# Patient Record
Sex: MALE | Race: WHITE | NOT HISPANIC OR LATINO | Employment: FULL TIME | URBAN - NONMETROPOLITAN AREA
[De-identification: names, ages, dates, MRNs, and addresses within clinical notes are randomized per-mention and may not be internally consistent; named-entity substitution may affect disease eponyms.]

---

## 2021-12-23 ENCOUNTER — OFFICE VISIT (OUTPATIENT)
Dept: URGENT CARE | Facility: CLINIC | Age: 46
End: 2021-12-23
Payer: COMMERCIAL

## 2021-12-23 VITALS
RESPIRATION RATE: 20 BRPM | OXYGEN SATURATION: 97 % | WEIGHT: 227.2 LBS | HEART RATE: 120 BPM | HEIGHT: 67 IN | BODY MASS INDEX: 35.66 KG/M2 | TEMPERATURE: 99.3 F

## 2021-12-23 DIAGNOSIS — J01.10 ACUTE FRONTAL SINUSITIS, RECURRENCE NOT SPECIFIED: Primary | ICD-10-CM

## 2021-12-23 DIAGNOSIS — R05.9 COUGH: ICD-10-CM

## 2021-12-23 PROBLEM — F41.9 ANXIETY: Status: ACTIVE | Noted: 2021-04-16

## 2021-12-23 PROBLEM — E78.5 DYSLIPIDEMIA: Status: ACTIVE | Noted: 2019-04-08

## 2021-12-23 PROBLEM — Z87.39 HISTORY OF GOUT: Status: ACTIVE | Noted: 2021-04-16

## 2021-12-23 PROCEDURE — 99203 OFFICE O/P NEW LOW 30 MIN: CPT | Performed by: PHYSICIAN ASSISTANT

## 2021-12-23 PROCEDURE — 87636 SARSCOV2 & INF A&B AMP PRB: CPT | Performed by: PHYSICIAN ASSISTANT

## 2021-12-23 RX ORDER — AMOXICILLIN AND CLAVULANATE POTASSIUM 875; 125 MG/1; MG/1
1 TABLET, FILM COATED ORAL EVERY 12 HOURS SCHEDULED
Qty: 14 TABLET | Refills: 0 | Status: SHIPPED | OUTPATIENT
Start: 2021-12-23 | End: 2021-12-30

## 2021-12-23 RX ORDER — INDOMETHACIN 25 MG/1
CAPSULE ORAL
COMMUNITY
Start: 2021-10-22

## 2021-12-26 LAB
FLUAV RNA RESP QL NAA+PROBE: POSITIVE
FLUBV RNA RESP QL NAA+PROBE: NEGATIVE
SARS-COV-2 RNA RESP QL NAA+PROBE: NEGATIVE

## 2022-05-17 ENCOUNTER — OFFICE VISIT (OUTPATIENT)
Dept: URGENT CARE | Facility: CLINIC | Age: 47
End: 2022-05-17
Payer: COMMERCIAL

## 2022-05-17 VITALS
TEMPERATURE: 98 F | RESPIRATION RATE: 20 BRPM | DIASTOLIC BLOOD PRESSURE: 78 MMHG | HEART RATE: 94 BPM | SYSTOLIC BLOOD PRESSURE: 138 MMHG | OXYGEN SATURATION: 95 %

## 2022-05-17 DIAGNOSIS — M10.9 ACUTE GOUT INVOLVING TOE OF LEFT FOOT, UNSPECIFIED CAUSE: Primary | ICD-10-CM

## 2022-05-17 PROCEDURE — 99213 OFFICE O/P EST LOW 20 MIN: CPT

## 2022-05-17 RX ORDER — PREDNISONE 20 MG/1
40 TABLET ORAL DAILY
Qty: 10 TABLET | Refills: 0 | Status: SHIPPED | OUTPATIENT
Start: 2022-05-17 | End: 2022-05-22

## 2022-05-17 NOTE — PATIENT INSTRUCTIONS
Take the prednisone once daily for 5 days  Stop taking the indomethacin  Apply ice to the area  Avoid foods that are high in purines including shellfish and beers

## 2022-05-17 NOTE — PROGRESS NOTES
[facility]        NAME: Allison Hernandez is a 48 y o  male  : 1975    MRN: [id number]  DATE: May 17, 2022  TIME: 8:05 PM    Assessment and Plan   Acute gout involving toe of left foot, unspecified cause [M10 9]  1  Acute gout involving toe of left foot, unspecified cause  predniSONE 20 mg tablet     Recheck of patient's blood pressure reveals normal blood pressure will trial 5 days of prednisone therapy and as indomethacin is not working  Patient advised if this does not work to proceed to orthopedics or family provider for possible intra-articular joint injection  Patient Instructions   Take the prednisone once daily for 5 days  Stop taking the indomethacin  Apply ice to the area  Avoid foods that are high in purines including shellfish and beers  Follow up with PCP in 3-5 days  Proceed to  ER if symptoms worsen  Chief Complaint     Chief Complaint   Patient presents with    gout flare up x 1 week     Out of his Indomethacin         History of Present Illness       Patient is a 48year old male who present to the office today for gout flare states that he started indomethacin by his PCP on Tuesday  States the flare has been going on for about 1 week  Did admit to seafood on mothers day  States only drinks alcohol about once a week  Has tried excedrin and icy hot without relief  Review of Systems   Review of Systems   Constitutional: Negative for chills, fatigue and fever  Respiratory: Negative for cough, shortness of breath and wheezing  Cardiovascular: Negative for chest pain and palpitations  Musculoskeletal: Positive for joint swelling (Base of right great toe)  Skin: Positive for color change  All other systems reviewed and are negative  Current Medications       Current Outpatient Medications:     predniSONE 20 mg tablet, Take 2 tablets (40 mg total) by mouth in the morning for 5 days  , Disp: 10 tablet, Rfl: 0    indomethacin (INDOCIN) 25 mg capsule, Take 1 tablet every 8 hours during a gout flare  May stop 2 days after resolution of symptoms  Do not take for more than 5 to 7 days  , Disp: , Rfl:     Current Allergies     Allergies as of 05/17/2022    (No Known Allergies)            The following portions of the patient's history were reviewed and updated as appropriate: allergies, current medications, past family history, past medical history, past social history, past surgical history and problem list      History reviewed  No pertinent past medical history  History reviewed  No pertinent surgical history  History reviewed  No pertinent family history  Medications have been verified  Objective   /78   Pulse 94   Temp 98 °F (36 7 °C)   Resp 20   SpO2 95%        Physical Exam     Physical Exam  Vitals and nursing note reviewed  Constitutional:       General: He is not in acute distress  Appearance: Normal appearance  He is normal weight  He is not ill-appearing or toxic-appearing  Cardiovascular:      Rate and Rhythm: Normal rate and regular rhythm  Pulses: Normal pulses  Heart sounds: Normal heart sounds  No murmur heard  No friction rub  No gallop  Pulmonary:      Effort: Pulmonary effort is normal  No respiratory distress  Breath sounds: Normal breath sounds  No stridor  No wheezing, rhonchi or rales  Chest:      Chest wall: No tenderness  Musculoskeletal:         General: Swelling and tenderness present  Feet:    Skin:     General: Skin is warm and dry  Capillary Refill: Capillary refill takes less than 2 seconds  Neurological:      General: No focal deficit present  Mental Status: He is alert

## 2023-05-24 ENCOUNTER — OFFICE VISIT (OUTPATIENT)
Dept: URGENT CARE | Facility: CLINIC | Age: 48
End: 2023-05-24

## 2023-05-24 VITALS
RESPIRATION RATE: 20 BRPM | DIASTOLIC BLOOD PRESSURE: 96 MMHG | OXYGEN SATURATION: 96 % | HEART RATE: 90 BPM | SYSTOLIC BLOOD PRESSURE: 137 MMHG | TEMPERATURE: 97.7 F

## 2023-05-24 DIAGNOSIS — J06.9 VIRAL URI WITH COUGH: Primary | ICD-10-CM

## 2023-05-24 RX ORDER — ALLOPURINOL 100 MG/1
100 TABLET ORAL DAILY
COMMUNITY

## 2023-05-24 RX ORDER — BENZONATATE 200 MG/1
200 CAPSULE ORAL 3 TIMES DAILY PRN
Qty: 20 CAPSULE | Refills: 0 | Status: SHIPPED | OUTPATIENT
Start: 2023-05-24

## 2023-05-24 RX ORDER — FLUTICASONE PROPIONATE 50 MCG
1 SPRAY, SUSPENSION (ML) NASAL DAILY
Qty: 9.9 ML | Refills: 0 | Status: SHIPPED | OUTPATIENT
Start: 2023-05-24

## 2023-05-24 NOTE — PROGRESS NOTES
"  [facility]        NAME: Gladis Robison is a 50 y o  male  : 1975    MRN: [id number]  DATE: May 24, 2023  TIME: 6:08 PM    Assessment and Plan   Viral URI with cough [J06 9]  1  Viral URI with cough  fluticasone (FLONASE) 50 mcg/act nasal spray    benzonatate (TESSALON) 200 MG capsule        Symptoms consistent with viral illness recommend supportive care we will send Tessalon for cough and Flonase  Patient Instructions   Pt appears to have a viral upper respiratory infection  Antibiotics are contraindicated at this time and would not help you feel better faster  Although the symptoms are troublesome, usually the patient's body is able to recover from a viral infection on an average time of 7-10 days  Fever, if any, typically resolves after 3-5 days  If patient has sore throat, typically this resolves within 3-5 days  Any nasal congestion, runny nose, post nasal drip typically begin to  improve after 7-10 days  Any cough may linger over a couple weeks  Please note that having a cough is not necessarily a bad thing  It often times is part of our body's protective mechanism to help keep our airways clear  Please note that yellow mucous doesn't necessarily mean a \"bacterial\" infection  Yellow mucous doesn't automatically mean that an antibiotic is needed  It is not unusual for mucus to become more discolored in the days after the start of an upper respiratory infection  Often times this is due to mucous that has thickened  with white blood cells that have flooded the mucosa to try and fight the viral infection  Ear Pain may occur when the eustachian tubes become blocked with mucous or swollen due to acute inflammation from illness  May give Ibuprofen or Tylenol as needed for comfort  May also use warm compress against ear for comfort    If ear ache is persisting and not improving over 2-3 days or if there is any gross drainage coming from ear, please seek further " evaluation  Natural remedies to help provide comfort for cough/ cold symptoms include: one teaspoon of honey (only in infants over 1 year of age), increased vitamin C (oranges, kit, etc ), ginger, and drinking plenty of fluids  Vaporizer by bedside  If you should have prolonged symptoms (Greater than 10 days), worsening symptoms, or any new symptoms please seek further medical attention  If you would be having difficulty breathing, seek further evaluation by calling 911 or proceeding to ER for further evaluation  Follow up with PCP in 3-5 days  Proceed to  ER if symptoms worsen  Chief Complaint     Chief Complaint   Patient presents with   • Cold Like Symptoms     Sore throat, congestion and cough since Thursday         History of Present Illness       Patient is a 50year old male who presents to the office today for cough, rhinorrhea, nasal congestion, headache  Does have some sinus pressure  Wife is here with similar symptoms   started on Thursday  No sore throat, fever  Has been taking delsym, advil cold and flu with minimal relief  Review of Systems   Review of Systems   Constitutional: Negative for fever  HENT: Positive for congestion, postnasal drip, rhinorrhea and sinus pressure  Respiratory: Positive for cough  Negative for shortness of breath and wheezing  Cardiovascular: Negative for chest pain and palpitations  Neurological: Negative for headaches  All other systems reviewed and are negative          Current Medications       Current Outpatient Medications:   •  allopurinol (ZYLOPRIM) 100 mg tablet, Take 100 mg by mouth daily, Disp: , Rfl:   •  benzonatate (TESSALON) 200 MG capsule, Take 1 capsule (200 mg total) by mouth 3 (three) times a day as needed for cough, Disp: 20 capsule, Rfl: 0  •  fluticasone (FLONASE) 50 mcg/act nasal spray, 1 spray into each nostril daily, Disp: 9 9 mL, Rfl: 0  •  indomethacin (INDOCIN) 25 mg capsule, Take 1 tablet every 8 hours during a gout flare  May stop 2 days after resolution of symptoms  Do not take for more than 5 to 7 days  (Patient not taking: Reported on 5/24/2023), Disp: , Rfl:     Current Allergies     Allergies as of 05/24/2023   • (No Known Allergies)            The following portions of the patient's history were reviewed and updated as appropriate: allergies, current medications, past family history, past medical history, past social history, past surgical history and problem list      Past Medical History:   Diagnosis Date   • Gout        History reviewed  No pertinent surgical history  History reviewed  No pertinent family history  Medications have been verified  Objective   /96   Pulse 90   Temp 97 7 °F (36 5 °C)   Resp 20   SpO2 96%        Physical Exam     Physical Exam  Vitals and nursing note reviewed  Constitutional:       General: He is not in acute distress  Appearance: Normal appearance  He is normal weight  He is not ill-appearing or toxic-appearing  HENT:      Right Ear: Tympanic membrane normal       Left Ear: Tympanic membrane normal       Nose: Congestion present  Right Turbinates: Enlarged and swollen  Left Turbinates: Enlarged and swollen  Mouth/Throat:      Mouth: Mucous membranes are moist    Cardiovascular:      Rate and Rhythm: Normal rate and regular rhythm  Pulses: Normal pulses  Heart sounds: Normal heart sounds  Pulmonary:      Effort: Pulmonary effort is normal       Breath sounds: Normal breath sounds  Skin:     General: Skin is warm  Capillary Refill: Capillary refill takes less than 2 seconds  Neurological:      General: No focal deficit present  Mental Status: He is alert and oriented to person, place, and time

## 2023-05-24 NOTE — PATIENT INSTRUCTIONS
"Pt appears to have a viral upper respiratory infection  Antibiotics are contraindicated at this time and would not help you feel better faster  Although the symptoms are troublesome, usually the patient's body is able to recover from a viral infection on an average time of 7-10 days  Fever, if any, typically resolves after 3-5 days  If patient has sore throat, typically this resolves within 3-5 days  Any nasal congestion, runny nose, post nasal drip typically begin to  improve after 7-10 days  Any cough may linger over a couple weeks  Please note that having a cough is not necessarily a bad thing  It often times is part of our body's protective mechanism to help keep our airways clear  Please note that yellow mucous doesn't necessarily mean a \"bacterial\" infection  Yellow mucous doesn't automatically mean that an antibiotic is needed  It is not unusual for mucus to become more discolored in the days after the start of an upper respiratory infection  Often times this is due to mucous that has thickened  with white blood cells that have flooded the mucosa to try and fight the viral infection  Ear Pain may occur when the eustachian tubes become blocked with mucous or swollen due to acute inflammation from illness  May give Ibuprofen or Tylenol as needed for comfort  May also use warm compress against ear for comfort  If ear ache is persisting and not improving over 2-3 days or if there is any gross drainage coming from ear, please seek further evaluation  Natural remedies to help provide comfort for cough/ cold symptoms include: one teaspoon of honey (only in infants over 1 year of age), increased vitamin C (oranges, kit, etc ), ginger, and drinking plenty of fluids  Vaporizer by bedside  If you should have prolonged symptoms (Greater than 10 days), worsening symptoms, or any new symptoms please seek further medical attention      If you would be having difficulty breathing, seek " further evaluation by calling 911 or proceeding to ER for further evaluation

## 2023-12-06 ENCOUNTER — OFFICE VISIT (OUTPATIENT)
Dept: URGENT CARE | Facility: CLINIC | Age: 48
End: 2023-12-06
Payer: COMMERCIAL

## 2023-12-06 VITALS
RESPIRATION RATE: 20 BRPM | SYSTOLIC BLOOD PRESSURE: 138 MMHG | OXYGEN SATURATION: 96 % | BODY MASS INDEX: 37.93 KG/M2 | HEART RATE: 102 BPM | TEMPERATURE: 98.6 F | WEIGHT: 242.2 LBS | DIASTOLIC BLOOD PRESSURE: 100 MMHG

## 2023-12-06 DIAGNOSIS — J01.90 ACUTE SINUSITIS, RECURRENCE NOT SPECIFIED, UNSPECIFIED LOCATION: Primary | ICD-10-CM

## 2023-12-06 PROCEDURE — 99213 OFFICE O/P EST LOW 20 MIN: CPT | Performed by: PHYSICIAN ASSISTANT

## 2023-12-06 RX ORDER — BROMPHENIRAMINE MALEATE, PSEUDOEPHEDRINE HYDROCHLORIDE, AND DEXTROMETHORPHAN HYDROBROMIDE 2; 30; 10 MG/5ML; MG/5ML; MG/5ML
10 SYRUP ORAL 3 TIMES DAILY PRN
Qty: 473 ML | Refills: 0 | Status: SHIPPED | OUTPATIENT
Start: 2023-12-06

## 2023-12-06 RX ORDER — AMOXICILLIN AND CLAVULANATE POTASSIUM 875; 125 MG/1; MG/1
1 TABLET, FILM COATED ORAL EVERY 12 HOURS SCHEDULED
Qty: 14 TABLET | Refills: 0 | Status: SHIPPED | OUTPATIENT
Start: 2023-12-06 | End: 2023-12-13

## 2023-12-06 RX ORDER — COLCHICINE 0.6 MG/1
0.6 TABLET ORAL DAILY
COMMUNITY

## 2023-12-06 NOTE — PROGRESS NOTES
[facility]        NAME: Mallory Romano is a 50 y.o. male  : 1975    MRN: [id number]  DATE: 2023  TIME: 5:33 PM    Assessment and Plan   Acute sinusitis, recurrence not specified, unspecified location [J01.90]  1. Acute sinusitis, recurrence not specified, unspecified location  amoxicillin-clavulanate (AUGMENTIN) 875-125 mg per tablet    brompheniramine-pseudoephedrine-DM 30-2-10 MG/5ML syrup            Patient Instructions     Medication as prescribed  Tylenol Sinus over the counter  Warm compresses over sinuses  Steam treatment (practice proper safety precautions when handing hot liquids/steam)  Over the counter saline nasal spray and flonase  Follow up with PCP in 3-5 days. Proceed to  ER if symptoms worsen. Eat yogurt with live and active cultures and/or take a probiotic at least 3 hours before or after antibiotic dose. Monitor stool for diarrhea and/or blood. If this occurs, contact primary care doctor ASAP. Chief Complaint     Chief Complaint   Patient presents with    Cold Like Symptoms     3 weeks of sinus congestion and cough. Using Flonase and Robitussin. History of Present Illness       Sinusitis  This is a new problem. Episode onset: 3 weeks. The problem has been gradually worsening since onset. There has been no fever. The pain is moderate. Associated symptoms include congestion, coughing and sinus pressure. Pertinent negatives include no chills, ear pain, headaches, shortness of breath, sneezing or sore throat. Treatments tried: flonase; robitussin. Review of Systems   Review of Systems   Constitutional:  Negative for chills and fever. HENT:  Positive for congestion, rhinorrhea and sinus pressure. Negative for dental problem, ear discharge, ear pain, facial swelling, postnasal drip, sneezing, sore throat and trouble swallowing. Eyes:  Negative for itching. Respiratory:  Positive for cough.  Negative for chest tightness, shortness of breath and wheezing. Gastrointestinal:  Negative for abdominal pain, constipation, diarrhea, nausea and vomiting. Musculoskeletal:  Negative for myalgias. Skin:  Negative for rash. Neurological:  Negative for dizziness, weakness, light-headedness and headaches. Current Medications       Current Outpatient Medications:     allopurinol (ZYLOPRIM) 100 mg tablet, Take 100 mg by mouth daily, Disp: , Rfl:     amoxicillin-clavulanate (AUGMENTIN) 875-125 mg per tablet, Take 1 tablet by mouth every 12 (twelve) hours for 7 days, Disp: 14 tablet, Rfl: 0    brompheniramine-pseudoephedrine-DM 30-2-10 MG/5ML syrup, Take 10 mL by mouth 3 (three) times a day as needed for cough or congestion, Disp: 473 mL, Rfl: 0    colchicine (COLCRYS) 0.6 mg tablet, Take 0.6 mg by mouth daily, Disp: , Rfl:     fluticasone (FLONASE) 50 mcg/act nasal spray, 1 spray into each nostril daily, Disp: 9.9 mL, Rfl: 0    benzonatate (TESSALON) 200 MG capsule, Take 1 capsule (200 mg total) by mouth 3 (three) times a day as needed for cough (Patient not taking: Reported on 12/6/2023), Disp: 20 capsule, Rfl: 0    indomethacin (INDOCIN) 25 mg capsule, Take 1 tablet every 8 hours during a gout flare. May stop 2 days after resolution of symptoms. Do not take for more than 5 to 7 days. (Patient not taking: Reported on 5/24/2023), Disp: , Rfl:     Current Allergies     Allergies as of 12/06/2023    (No Known Allergies)            The following portions of the patient's history were reviewed and updated as appropriate: allergies, current medications, past family history, past medical history, past social history, past surgical history and problem list.     Past Medical History:   Diagnosis Date    Gout        History reviewed. No pertinent surgical history. History reviewed. No pertinent family history. Medications have been verified.         Objective   /100   Pulse 102   Temp 98.6 °F (37 °C)   Resp 20   Wt 110 kg (242 lb 3.2 oz)   SpO2 96%   BMI 37.93 kg/m²   No LMP for male patient. Physical Exam     Physical Exam  Vitals reviewed. Constitutional:       General: He is not in acute distress. Appearance: He is well-developed. He is not diaphoretic. HENT:      Head: Normocephalic. Right Ear: Tympanic membrane, ear canal and external ear normal.      Left Ear: Tympanic membrane, ear canal and external ear normal.      Nose: Nose normal.      Mouth/Throat:      Pharynx: No oropharyngeal exudate or posterior oropharyngeal erythema. Eyes:      Conjunctiva/sclera: Conjunctivae normal.   Cardiovascular:      Rate and Rhythm: Normal rate and regular rhythm. Heart sounds: Normal heart sounds. No murmur heard. No friction rub. No gallop. Pulmonary:      Effort: Pulmonary effort is normal. No respiratory distress. Breath sounds: Normal breath sounds. No wheezing, rhonchi or rales. Lymphadenopathy:      Cervical: No cervical adenopathy. Skin:     General: Skin is warm. Neurological:      Mental Status: He is alert and oriented to person, place, and time. Psychiatric:         Behavior: Behavior normal.         Thought Content:  Thought content normal.         Judgment: Judgment normal.

## 2023-12-06 NOTE — PATIENT INSTRUCTIONS
Medication as prescribed  Tylenol Sinus over the counter  Warm compresses over sinuses  Steam treatment (practice proper safety precautions when handing hot liquids/steam)  Over the counter saline nasal spray and flonase  Follow up with PCP in 3-5 days. Proceed to  ER if symptoms worsen. Eat yogurt with live and active cultures and/or take a probiotic at least 3 hours before or after antibiotic dose. Monitor stool for diarrhea and/or blood. If this occurs, contact primary care doctor ASAP.

## 2025-01-20 DIAGNOSIS — J01.90 ACUTE SINUSITIS, RECURRENCE NOT SPECIFIED, UNSPECIFIED LOCATION: ICD-10-CM

## 2025-03-21 RX ORDER — BROMPHENIRAMINE MALEATE, PSEUDOEPHEDRINE HYDROCHLORIDE, AND DEXTROMETHORPHAN HYDROBROMIDE 2; 30; 10 MG/5ML; MG/5ML; MG/5ML
SYRUP ORAL
Qty: 473 ML | Refills: 0 | OUTPATIENT
Start: 2025-03-21

## 2025-04-01 ENCOUNTER — OFFICE VISIT (OUTPATIENT)
Dept: URGENT CARE | Facility: CLINIC | Age: 50
End: 2025-04-01
Payer: COMMERCIAL

## 2025-04-01 VITALS
OXYGEN SATURATION: 96 % | HEART RATE: 106 BPM | TEMPERATURE: 99 F | SYSTOLIC BLOOD PRESSURE: 160 MMHG | DIASTOLIC BLOOD PRESSURE: 100 MMHG | RESPIRATION RATE: 16 BRPM

## 2025-04-01 DIAGNOSIS — R07.9 CHEST PAIN, UNSPECIFIED TYPE: Primary | ICD-10-CM

## 2025-04-01 PROCEDURE — 93005 ELECTROCARDIOGRAM TRACING: CPT

## 2025-04-01 PROCEDURE — G0382 LEV 3 HOSP TYPE B ED VISIT: HCPCS

## 2025-04-01 PROCEDURE — S9083 URGENT CARE CENTER GLOBAL: HCPCS

## 2025-04-01 NOTE — PROGRESS NOTES
[facility]        NAME: Sourav Corral is a 49 y.o. male  : 1975    MRN: [id number]  DATE: 2025  TIME: 1:14 PM    Assessment and Plan   Chest pain, unspecified type [R07.9]  1. Chest pain, unspecified type  ECG 12 lead    Transfer to other facility        Ecg NSR rate 90  Given history with strong family hx advise patient be seen in ED to rule out NSTEMI.   Report given to [facility]    Patient Instructions     Proceed to  ER    Chief Complaint     Chief Complaint   Patient presents with    Abdominal Pain    Vomiting     2 days of abdominal pain and vomiting about 6 episodes          History of Present Illness       Patient is a 49-year-old male presents to the office today for vomiting and heartburn for 2 days with some epigastric discomfort.  Denies any diarrhea or fever.  Denies sick contacts at home.  Denies jaw pain diaphoresis chest pain shortness of breath arm pain.  Does have a history of dyslipidemia which per chart review is improved. Has intermittent HTN, not medicated. No smoking hx. Mother dies from MI in 50s maternal grandfather diet from MI in 30s.        Review of Systems   Review of Systems   Cardiovascular:  Positive for chest pain (heart burn).   Gastrointestinal:  Positive for nausea and vomiting.   All other systems reviewed and are negative.        Current Medications       Current Outpatient Medications:     allopurinol (ZYLOPRIM) 100 mg tablet, Take 100 mg by mouth daily, Disp: , Rfl:     colchicine (COLCRYS) 0.6 mg tablet, Take 0.6 mg by mouth daily, Disp: , Rfl:     fluticasone (FLONASE) 50 mcg/act nasal spray, 1 spray into each nostril daily, Disp: 9.9 mL, Rfl: 0    Current Allergies     Allergies as of 2025    (No Known Allergies)            The following portions of the patient's history were reviewed and updated as appropriate: allergies, current medications, past family history, past medical history, past social history, past surgical  history and problem list.     Past Medical History:   Diagnosis Date    Gout        History reviewed. No pertinent surgical history.    History reviewed. No pertinent family history.      Medications have been verified.        Objective   /100   Pulse (!) 106   Temp 99 °F (37.2 °C)   Resp 16   SpO2 96%        Physical Exam     Physical Exam  Vitals and nursing note reviewed.   Constitutional:       General: He is not in acute distress.     Appearance: He is obese. He is not ill-appearing or toxic-appearing.   Cardiovascular:      Rate and Rhythm: Normal rate and regular rhythm.      Heart sounds: Normal heart sounds.   Pulmonary:      Effort: Pulmonary effort is normal.      Breath sounds: Normal breath sounds.   Abdominal:      General: Bowel sounds are normal.      Palpations: Abdomen is soft.      Tenderness: There is no abdominal tenderness.   Skin:     General: Skin is warm.      Capillary Refill: Capillary refill takes less than 2 seconds.   Neurological:      Mental Status: He is alert.

## 2025-04-02 LAB
ATRIAL RATE: 91 BPM
P AXIS: 22 DEGREES
PR INTERVAL: 154 MS
QRS AXIS: -13 DEGREES
QRSD INTERVAL: 90 MS
QT INTERVAL: 354 MS
QTC INTERVAL: 436 MS
T WAVE AXIS: 18 DEGREES
VENTRICULAR RATE: 91 BPM

## 2025-04-02 PROCEDURE — 93010 ELECTROCARDIOGRAM REPORT: CPT | Performed by: INTERNAL MEDICINE
